# Patient Record
Sex: MALE | Race: WHITE
[De-identification: names, ages, dates, MRNs, and addresses within clinical notes are randomized per-mention and may not be internally consistent; named-entity substitution may affect disease eponyms.]

---

## 2018-06-30 ENCOUNTER — HOSPITAL ENCOUNTER (EMERGENCY)
Dept: HOSPITAL 7 - FB.ED | Age: 28
Discharge: HOME | End: 2018-06-30
Payer: COMMERCIAL

## 2018-06-30 VITALS — DIASTOLIC BLOOD PRESSURE: 66 MMHG | SYSTOLIC BLOOD PRESSURE: 125 MMHG
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
## 2018-06-30 DIAGNOSIS — S63.91XA: Primary | ICD-10-CM

## 2018-06-30 NOTE — EDM.PDOC
ED HPI GENERAL MEDICAL PROBLEM





- General


Stated Complaint: RT HAND INJURY


Time Seen by Provider: 06/30/18 03:10


Source of Information: Reports: Patient, Family


History Limitations: Reports: No Limitations





- History of Present Illness


INITIAL COMMENTS - FREE TEXT/NARRATIVE: 





27 y.o. came to the ed with his family after he cought right hand in between 

doors. He c/o swelling and decr movement of his right hand. No other acute 

medical issues.  /56 RR 17 Pulse ox 97% on RA Temp 36.6 pulse 81.


Onset: Today


Onset Date: 06/30/18


Onset Time: 02:00


Duration: Hour(s):


Location: Reports: Upper Extremity, Right (hand)


Quality: Reports: Burning, Dull, Pressure


Improves with: Reports: Cold Therapy, Rest


Worsens with: Reports: Movement


Context: Reports: Trauma (hand between doors)


Associated Symptoms: Reports: No Other Symptoms


  ** Rt hand


Pain Score (Numeric/FACES): 7





- Related Data


 Allergies











Allergy/AdvReac Type Severity Reaction Status Date / Time


 


No Known Allergies Allergy   Verified 06/30/18 03:26











Home Meds: 


 Home Meds





NK [No Known Home Meds]  06/30/18 [History]











Past Medical History





- Past Health History


Medical/Surgical History: Denies Medical/Surgical History





- Infectious Disease History


Infectious Disease History: Reports: TB


Other Infectious Disease History: states that he was diagnosed with TB when he 

was young.





Social & Family History





- Family History


Family Medical History: Noncontributory





Review of Systems





- Review of Systems


Review Of Systems: See Below


Constitutional: Reports: No Symptoms


Eyes: Reports: No Symptoms


Ears: Reports: No Symptoms


Nose: Reports: No Symptoms


Mouth/Throat: Reports: No Symptoms


Respiratory: Reports: No Symptoms


Cardiovascular: Reports: No Symptoms


GI/Abdominal: Reports: No Symptoms


Genitourinary: Reports: No Symptoms


Musculoskeletal: Reports: Hand Pain


Skin: Reports: Wound (abrasion)


Neurological: Reports: No Symptoms


Psychiatric: Reports: No Symptoms





ED EXAM, GENERAL





- Physical Exam


Exam: See Below


Exam Limited By: No Limitations


General Appearance: Alert, WD/WN, Mild Distress


Eye Exam: Bilateral Eye: Normal Inspection


Ears: Normal External Exam


Ear Exam: Bilateral Ear: Auricle Normal


Nose: Normal Inspection, Normal Mucosa, No Blood


Throat/Mouth: Normal Inspection, Normal Lips, Normal Gums, Normal Voice, No 

Airway Compromise


Head: Atraumatic, Normocephalic


Neck: Normal Inspection, Supple, Non-Tender, Full Range of Motion


Respiratory/Chest: No Respiratory Distress, Lungs Clear, Normal Breath Sounds, 

Chest Non-Tender


Cardiovascular: Normal Peripheral Pulses, Regular Rate, Rhythm, No Edema, No 

Gallop, No JVD, No Murmur


Peripheral Pulses: 1+: Brachial (L)


GI/Abdominal: Normal Bowel Sounds, Soft, Non-Tender, No Organomegaly


 (Male) Exam: Deferred


Rectal (Males) Exam: Deferred


Back Exam: Normal Inspection, Full Range of Motion


Extremities: Normal Capillary Refill, Other (swelling of right hand, dorsal 

aspect)


Neurological: Alert, Oriented, CN II-XII Intact, Normal Cognition, Normal Gait, 

No Motor/Sensory Deficits


Psychiatric: Normal Affect, Normal Mood


Skin Exam: Warm, Dry, Normal Color, No Rash, Other (abrasion right hand, dorsal 

aspect)


Lymphatic: No Adenopathy





Course





- Vital Signs


Text/Narrative:: 





27 y.o.wm came to the ed with his family after he cought right hand in between 

doors. He c/o swelling and decr movement of his right hand. No other acute 

medical issues. TD UTD (< 5 years) /56 RR 17 Pulse ox 97% on RA Temp 36.6 

pulse 81.


PE: 27 y.o.w.m. with right hand swelling


Imaging: R hand: NAD, official report is pending


Imaging: Right hand: NAD


Impression: Right hand sprain with soft tissue swelling/ abrasion


Tx: Neosporine ointment, ICE, Motrin at home


Reexam: Improved


Plan: D/C with instructions





Last Recorded V/S: 


 Last Vital Signs











Temp  36.7 C   06/30/18 03:10


 


Pulse  98   06/30/18 03:10


 


Resp  17   06/30/18 03:10


 


BP  125/66   06/30/18 03:10


 


Pulse Ox  96   06/30/18 03:10














- Orders/Labs/Meds


Orders: 


 Active Orders 24 hr











 Category Date Time Status


 


 Hand Comp Min 3V Rt [CR] Stat Exams  06/30/18 03:32 Taken











Meds: 


Medications














Discontinued Medications














Generic Name Dose Route Start Last Admin





  Trade Name Freq  PRN Reason Stop Dose Admin


 


Ibuprofen  600 mg  06/30/18 03:33  06/30/18 03:52





  Motrin  PO  06/30/18 03:34  600 mg





  ONETIME ONE   Administration





     





     





     





     














Departure





- Departure


Time of Disposition: 04:24


Disposition: Home, Self-Care 01


Condition: Good


Clinical Impression: 


Sprain of hand, right


Qualifiers:


 Encounter type: initial encounter Qualified Code(s): S63.91XA - Sprain of 

unspecified part of right wrist and hand, initial encounter





Abrasion hand


Qualifiers:


 Encounter type: initial encounter Laterality: right Qualified Code(s): 

S60.511A - Abrasion of right hand, initial encounter








- Discharge Information


Instructions:  Abrasion, Easy-to-Read, Hand Pain


Referrals: 


PCP,Unknown [Primary Care Provider] - 


Forms:  ED Return to Work/School Form


Additional Instructions: 


Rest, Ice and elevation of right hand, please apply neosporine to right hand 

abrasion, please f/u, come back if your symptoms get worse acutely





- My Orders


Last 24 Hours: 


My Active Orders





06/30/18 03:32


Hand Comp Min 3V Rt [CR] Stat 














- Assessment/Plan


Last 24 Hours: 


My Active Orders





06/30/18 03:32


Hand Comp Min 3V Rt [CR] Stat